# Patient Record
Sex: FEMALE | Race: WHITE | NOT HISPANIC OR LATINO | URBAN - METROPOLITAN AREA
[De-identification: names, ages, dates, MRNs, and addresses within clinical notes are randomized per-mention and may not be internally consistent; named-entity substitution may affect disease eponyms.]

---

## 2019-11-05 ENCOUNTER — OUTPATIENT (OUTPATIENT)
Dept: OUTPATIENT SERVICES | Facility: HOSPITAL | Age: 29
LOS: 1 days | End: 2019-11-05

## 2023-01-26 PROBLEM — Z00.00 ENCOUNTER FOR PREVENTIVE HEALTH EXAMINATION: Status: ACTIVE | Noted: 2023-01-26

## 2023-02-01 ENCOUNTER — APPOINTMENT (OUTPATIENT)
Dept: ORTHOPEDIC SURGERY | Facility: CLINIC | Age: 33
End: 2023-02-01
Payer: COMMERCIAL

## 2023-02-01 DIAGNOSIS — Z78.9 OTHER SPECIFIED HEALTH STATUS: ICD-10-CM

## 2023-02-01 PROCEDURE — 99204 OFFICE O/P NEW MOD 45 MIN: CPT

## 2023-02-01 PROCEDURE — 73070 X-RAY EXAM OF ELBOW: CPT | Mod: RT

## 2023-02-01 NOTE — PHYSICAL EXAM
[Right] : right elbow [NL (150)] : flexion 150 degrees [NL (0)] : extension 0 degrees [NL (90)] : supination 90 degrees [4___] : extension 4[unfilled]/5 [2___] : supination 2[unfilled]/5 [] : negative milking

## 2023-02-01 NOTE — HISTORY OF PRESENT ILLNESS
[de-identified] : Patient reports pain in the medial elbow that began after starting her softball season about 1 year ago. The pain is with any lifting. She uses a brace, heat, ice and Motrin for the pain.

## 2023-03-08 ENCOUNTER — APPOINTMENT (OUTPATIENT)
Dept: ORTHOPEDIC SURGERY | Facility: CLINIC | Age: 33
End: 2023-03-08
Payer: COMMERCIAL

## 2023-03-08 DIAGNOSIS — M54.12 RADICULOPATHY, CERVICAL REGION: ICD-10-CM

## 2023-03-08 DIAGNOSIS — M77.01 MEDIAL EPICONDYLITIS, RIGHT ELBOW: ICD-10-CM

## 2023-03-08 PROCEDURE — 99213 OFFICE O/P EST LOW 20 MIN: CPT

## 2023-03-08 NOTE — PHYSICAL EXAM
[Right] : right elbow [NL (150)] : flexion 150 degrees [NL (0)] : extension 0 degrees [NL (90)] : supination 90 degrees [4___] : extension 4[unfilled]/5 [2___] : supination 2[unfilled]/5 [NL (45)] : right lateral flexion 45 degrees [NL (80)] : right lateral rotation 80 degrees [5___] : right grasp 5[unfilled]/5 [Biceps 2+] : biceps 2+ [Triceps 2+] : triceps 2+ [Brachioradialis 2+] : brachioradialis 2+ [] : negative milking

## 2023-03-08 NOTE — HISTORY OF PRESENT ILLNESS
[de-identified] : Patient reports that the PT is helping but that the therapist believes the issue is relative to her neck surgery in 2019

## 2023-06-02 ENCOUNTER — APPOINTMENT (OUTPATIENT)
Dept: ENDOCRINOLOGY | Facility: CLINIC | Age: 33
End: 2023-06-02
Payer: COMMERCIAL

## 2023-06-02 VITALS
TEMPERATURE: 97.9 F | OXYGEN SATURATION: 99 % | SYSTOLIC BLOOD PRESSURE: 132 MMHG | BODY MASS INDEX: 29.25 KG/M2 | DIASTOLIC BLOOD PRESSURE: 86 MMHG | WEIGHT: 182 LBS | RESPIRATION RATE: 12 BRPM | HEART RATE: 91 BPM | HEIGHT: 66 IN

## 2023-06-02 DIAGNOSIS — R94.6 ABNORMAL RESULTS OF THYROID FUNCTION STUDIES: ICD-10-CM

## 2023-06-02 DIAGNOSIS — E16.2 HYPOGLYCEMIA, UNSPECIFIED: ICD-10-CM

## 2023-06-02 DIAGNOSIS — R00.2 PALPITATIONS: ICD-10-CM

## 2023-06-02 DIAGNOSIS — Z78.9 OTHER SPECIFIED HEALTH STATUS: ICD-10-CM

## 2023-06-02 DIAGNOSIS — R63.5 ABNORMAL WEIGHT GAIN: ICD-10-CM

## 2023-06-02 PROCEDURE — 99205 OFFICE O/P NEW HI 60 MIN: CPT

## 2023-06-02 RX ORDER — MELOXICAM 15 MG/1
15 TABLET ORAL
Qty: 30 | Refills: 3 | Status: DISCONTINUED | COMMUNITY
Start: 2023-03-08 | End: 2023-06-02

## 2023-06-02 NOTE — HISTORY OF PRESENT ILLNESS
[FreeTextEntry1] : This is a young 32-year-old white female with a past medical history of anxiety and difficulty losing weight presents for an endocrine evaluation.  Patient is concerned for the possibility of thyroid disease as her thyroid function test were reported to be abnormal in the beginning of the year.  Patient was advised to take [place] Thyroid but apparently was never started.  She denies any past history of thyroid disease.  Her review of systems is significant for gain of 50 pounds over the past few years but now she has been able to lose 20 pounds on a very strict diet and exercise.  Review of systems is significant for dry skin, brittle nails and thinning of the hair and eyebrows.  Her appetite is good and she has been eating a high-protein diet.  She claimed that she exercises on a regular basis.  She denies any history of cravings.  There is no difficulty swallowing neck pain or hoarseness.  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  Patient has been on the birth control pill for the past 20 years.  Family history father complications of diabetes and heart attack and mother is living and has breast cancer.  Patient currently is taking Ambien and BuSpar